# Patient Record
Sex: FEMALE | Race: WHITE | NOT HISPANIC OR LATINO | Employment: STUDENT | URBAN - METROPOLITAN AREA
[De-identification: names, ages, dates, MRNs, and addresses within clinical notes are randomized per-mention and may not be internally consistent; named-entity substitution may affect disease eponyms.]

---

## 2018-07-23 ENCOUNTER — OFFICE VISIT (OUTPATIENT)
Dept: FAMILY MEDICINE | Age: 23
End: 2018-07-23

## 2018-07-23 ENCOUNTER — LAB SERVICES (OUTPATIENT)
Dept: LAB | Age: 23
End: 2018-07-23

## 2018-07-23 VITALS
HEART RATE: 52 BPM | DIASTOLIC BLOOD PRESSURE: 72 MMHG | SYSTOLIC BLOOD PRESSURE: 108 MMHG | TEMPERATURE: 98.8 F | BODY MASS INDEX: 31.26 KG/M2 | WEIGHT: 194.5 LBS | HEIGHT: 66 IN | RESPIRATION RATE: 16 BRPM

## 2018-07-23 DIAGNOSIS — Z02.83 ENCOUNTER FOR DRUG SCREENING: ICD-10-CM

## 2018-07-23 DIAGNOSIS — Z00.00 ANNUAL PHYSICAL EXAM: Primary | ICD-10-CM

## 2018-07-23 PROCEDURE — 99385 PREV VISIT NEW AGE 18-39: CPT | Performed by: FAMILY MEDICINE

## 2018-07-23 PROCEDURE — 80307 DRUG TEST PRSMV CHEM ANLYZR: CPT | Performed by: INTERNAL MEDICINE

## 2018-07-23 ASSESSMENT — PATIENT HEALTH QUESTIONNAIRE - PHQ9
CLINICAL INTERPRETATION OF PHQ2 SCORE: 0
SUM OF ALL RESPONSES TO PHQ9 QUESTIONS 1 AND 2: 0

## 2018-07-24 LAB
AMPHETAMINES UR QL: NEGATIVE
BARBITURATES UR QL: NEGATIVE
BENZODIAZ UR QL: NEGATIVE
BZE UR QL: NEGATIVE
CANNABINOIDS UR QL SCN: POSITIVE
DRUGS UR SCN NOM: ABNORMAL
ETHANOL UR-MCNC: NEGATIVE MG/DL
METHADONE UR QL: NEGATIVE NG/ML
OPIATES UR QL: NEGATIVE
PCP UR QL: NEGATIVE

## 2018-08-28 PROBLEM — Z00.00 ANNUAL PHYSICAL EXAM: Status: ACTIVE | Noted: 2018-07-23

## 2018-08-28 PROBLEM — Z00.00 ANNUAL PHYSICAL EXAM: Status: ACTIVE | Noted: 2018-08-28

## 2021-07-14 NOTE — PROGRESS NOTES
Chief Complaint   Patient presents with    Establish Care         HPI:  Lisa Kaur is a 23 y.o. (: 1995) here today for establish care. No concerns or questions presented. Only wants a PCP. No interest in updating  today. Has gynecologist.   Has IUD  No preg plans in the next year    Review of Systems   Constitutional: Negative for activity change, appetite change, chills, fatigue, fever and unexpected weight change. HENT: Negative for congestion, postnasal drip, rhinorrhea, sinus pressure, sinus pain, sneezing and sore throat. Eyes: Negative for visual disturbance. Respiratory: Negative for cough, chest tightness, shortness of breath and wheezing. Cardiovascular: Negative for chest pain and palpitations. Gastrointestinal: Negative for abdominal pain, blood in stool, constipation, diarrhea, nausea and vomiting. Endocrine: Negative for cold intolerance, heat intolerance, polydipsia and polyuria. Genitourinary: Negative for dysuria, frequency, vaginal bleeding and vaginal discharge. Musculoskeletal: Negative for arthralgias, back pain, joint swelling, myalgias and neck pain. Skin: Negative for rash and wound. Allergic/Immunologic: Negative for environmental allergies. Neurological: Negative for dizziness, tremors, syncope, weakness, light-headedness, numbness and headaches. Hematological: Negative for adenopathy. Psychiatric/Behavioral: Negative for behavioral problems, decreased concentration, sleep disturbance and suicidal ideas. The patient is not nervous/anxious. History reviewed. No pertinent past medical history. Family History   Problem Relation Age of Onset    No Known Problems Mother     Diabetes Father        Social History     Tobacco Use    Smoking status: Never Smoker    Smokeless tobacco: Never Used   Vaping Use    Vaping Use: Never used   Substance Use Topics    Alcohol use:  Yes     Alcohol/week: 4.0 standard drinks     Types: 4 Glasses of wine per week     Comment: occassionally    Drug use: Never       New Prescriptions    No medications on file       Meds Prior to visit:  No current outpatient medications on file prior to visit. No current facility-administered medications on file prior to visit. No Known Allergies    OBJECTIVE:  /68 (Site: Left Upper Arm, Position: Sitting, Cuff Size: Medium Adult)   Pulse 72   Ht 5' 5.35\" (1.66 m)   Wt 183 lb 8 oz (83.2 kg)   BMI 30.21 kg/m²   BP Readings from Last 2 Encounters:   07/15/21 109/68     Wt Readings from Last 3 Encounters:   07/15/21 183 lb 8 oz (83.2 kg)       Physical Exam  Vitals reviewed. Constitutional:       General: She is not in acute distress. Appearance: Normal appearance. She is well-developed and normal weight. She is not ill-appearing. HENT:      Head: Normocephalic and atraumatic. Right Ear: Tympanic membrane, ear canal and external ear normal. There is no impacted cerumen. Left Ear: Tympanic membrane, ear canal and external ear normal. There is no impacted cerumen. Nose: Nose normal.      Mouth/Throat:      Mouth: Mucous membranes are moist.      Pharynx: Oropharynx is clear. No oropharyngeal exudate or posterior oropharyngeal erythema. Eyes:      General: No scleral icterus. Right eye: No discharge. Left eye: No discharge. Conjunctiva/sclera: Conjunctivae normal.      Pupils: Pupils are equal, round, and reactive to light. Cardiovascular:      Rate and Rhythm: Normal rate and regular rhythm. Pulses: Normal pulses. Heart sounds: Normal heart sounds. No murmur heard. Comments: Radial and pedal pulses intact  Pulmonary:      Effort: Pulmonary effort is normal. No respiratory distress. Breath sounds: Normal breath sounds. No wheezing or rales. Chest:      Chest wall: No tenderness. Abdominal:      General: Bowel sounds are normal.      Palpations: Abdomen is soft. Tenderness:  There is no abdominal tenderness. There is no guarding. Comments: Normal liver and spleen. No organomegaly   Musculoskeletal:         General: No tenderness. Normal range of motion. Cervical back: Normal range of motion and neck supple. Comments: Intact in all extremities   Lymphadenopathy:      Cervical: No cervical adenopathy. Skin:     General: Skin is warm. Coloration: Skin is not jaundiced. Findings: No bruising, erythema or rash. Neurological:      General: No focal deficit present. Mental Status: She is alert and oriented to person, place, and time. Mental status is at baseline. Sensory: No sensory deficit. Motor: No weakness or abnormal muscle tone. Coordination: Coordination normal.      Gait: Gait normal.      Deep Tendon Reflexes: Reflexes normal.   Psychiatric:         Mood and Affect: Mood normal.         Behavior: Behavior normal.         Thought Content: Thought content normal.         Judgment: Judgment normal.           ASSESSMENT/PLAN:  1. Encounter to establish care  VS reviewed and WNL    BMI reviewed   All questions answered. F/u discussed. Healthy lifestyle modifications discussed. Discussed use, benefit, and side effects of prescribed medications. Barriers to medication compliance addressed. All patient questions answered. Pt voiced understanding. RTC No follow-ups on file. No future appointments.     [facility] MD Renaldo  7/15/2021  4:53 PM

## 2021-07-15 ENCOUNTER — OFFICE VISIT (OUTPATIENT)
Dept: PRIMARY CARE CLINIC | Age: 26
End: 2021-07-15
Payer: COMMERCIAL

## 2021-07-15 VITALS
HEIGHT: 65 IN | HEART RATE: 72 BPM | WEIGHT: 183.5 LBS | SYSTOLIC BLOOD PRESSURE: 109 MMHG | BODY MASS INDEX: 30.57 KG/M2 | DIASTOLIC BLOOD PRESSURE: 68 MMHG

## 2021-07-15 DIAGNOSIS — Z76.89 ENCOUNTER TO ESTABLISH CARE: Primary | ICD-10-CM

## 2021-07-15 PROCEDURE — 99212 OFFICE O/P EST SF 10 MIN: CPT | Performed by: FAMILY MEDICINE

## 2021-07-15 SDOH — ECONOMIC STABILITY: FOOD INSECURITY: WITHIN THE PAST 12 MONTHS, YOU WORRIED THAT YOUR FOOD WOULD RUN OUT BEFORE YOU GOT MONEY TO BUY MORE.: NEVER TRUE

## 2021-07-15 SDOH — ECONOMIC STABILITY: FOOD INSECURITY: WITHIN THE PAST 12 MONTHS, THE FOOD YOU BOUGHT JUST DIDN'T LAST AND YOU DIDN'T HAVE MONEY TO GET MORE.: NEVER TRUE

## 2021-07-15 ASSESSMENT — ENCOUNTER SYMPTOMS
SORE THROAT: 0
BACK PAIN: 0
NAUSEA: 0
SINUS PAIN: 0
COUGH: 0
DIARRHEA: 0
VOMITING: 0
BLOOD IN STOOL: 0
RHINORRHEA: 0
SINUS PRESSURE: 0
SHORTNESS OF BREATH: 0
WHEEZING: 0
CHEST TIGHTNESS: 0
CONSTIPATION: 0
ABDOMINAL PAIN: 0

## 2021-07-15 ASSESSMENT — PATIENT HEALTH QUESTIONNAIRE - PHQ9
SUM OF ALL RESPONSES TO PHQ9 QUESTIONS 1 & 2: 0
1. LITTLE INTEREST OR PLEASURE IN DOING THINGS: 0
SUM OF ALL RESPONSES TO PHQ QUESTIONS 1-9: 0
2. FEELING DOWN, DEPRESSED OR HOPELESS: 0
SUM OF ALL RESPONSES TO PHQ QUESTIONS 1-9: 0
SUM OF ALL RESPONSES TO PHQ QUESTIONS 1-9: 0

## 2021-07-15 ASSESSMENT — SOCIAL DETERMINANTS OF HEALTH (SDOH): HOW HARD IS IT FOR YOU TO PAY FOR THE VERY BASICS LIKE FOOD, HOUSING, MEDICAL CARE, AND HEATING?: NOT HARD AT ALL

## 2021-12-10 ENCOUNTER — E-VISIT (OUTPATIENT)
Dept: PRIMARY CARE CLINIC | Age: 26
End: 2021-12-10
Payer: COMMERCIAL

## 2021-12-10 DIAGNOSIS — R30.0 DYSURIA: Primary | ICD-10-CM

## 2021-12-10 PROCEDURE — 99422 OL DIG E/M SVC 11-20 MIN: CPT | Performed by: NURSE PRACTITIONER

## 2021-12-10 RX ORDER — NITROFURANTOIN 25; 75 MG/1; MG/1
100 CAPSULE ORAL 2 TIMES DAILY
Qty: 14 CAPSULE | Refills: 0 | Status: SHIPPED | OUTPATIENT
Start: 2021-12-10 | End: 2021-12-17

## 2021-12-10 NOTE — PROGRESS NOTES
Reviewed questionnaire  Reviewed previous encounters, labs, meds, allergies and history    Dx dysuria    Plan  rx for macrobid    Increase oral fluids. Tylenol or motrin for pain.    May take azo over the counter    Please f/u with pcp if symptoms do not improve for further evaluation and possible urine culture    See educational handout    Time spent 11-20

## 2021-12-10 NOTE — PATIENT INSTRUCTIONS
Patient Education        Painful Urination (Dysuria): Care Instructions  Your Care Instructions  Burning pain with urination (dysuria) is a common symptom of a urinary tract infection or other urinary problems. The bladder may become inflamed. This can cause pain when the bladder fills and empties. You may also feel pain if the tube that carries urine from the bladder to the outside of the body (urethra) gets irritated or infected. Sexually transmitted infections (STIs) also may cause pain when you urinate. Sometimes the pain can be caused by things other than an infection. The urethra can be irritated by soaps, perfumes, or foreign objects in the urethra. Kidney stones can cause pain when they pass through the urethra. The cause may be hard to find. You may need tests. Treatment for painful urination depends on the cause. Follow-up care is a key part of your treatment and safety. Be sure to make and go to all appointments, and call your doctor if you are having problems. It's also a good idea to know your test results and keep a list of the medicines you take. How can you care for yourself at home? · Drink extra water for the next day or two. This will help make the urine less concentrated. (If you have kidney, heart, or liver disease and have to limit fluids, talk with your doctor before you increase the amount of fluids you drink.)  · Avoid drinks that are carbonated or have caffeine. They can irritate the bladder. · Urinate often. Try to empty your bladder each time. For women:  · Urinate right after you have sex. · After going to the bathroom, wipe from front to back. · Avoid douches, bubble baths, and feminine hygiene sprays. And avoid other feminine hygiene products that have deodorants. When should you call for help? Call your doctor now or seek immediate medical care if:    · You have new symptoms, such as fever, nausea, or vomiting.     · You have new or worse symptoms of a urinary problem. For example:  ? You have blood or pus in your urine. ? You have chills or body aches. ? It hurts worse to urinate. ? You have groin or belly pain. ? You have pain in your back just below your rib cage (the flank area). Watch closely for changes in your health, and be sure to contact your doctor if you have any problems. Where can you learn more? Go to [email or web address] and sign in to your [employer] account. Enter W997 in the [employer] box to learn more about \"Painful Urination (Dysuria): Care Instructions. \"     If you do not have an account, please click on the \"Sign Up Now\" link. Current as of: February 10, 2021               Content Version: 13.0  © 2006-2021 [employer]. Care instructions adapted under license by [employer]. If you have questions about a medical condition or this instruction, always ask your healthcare professional. [address] any warranty or liability for your use of this information.

## 2022-08-19 ENCOUNTER — E-VISIT (OUTPATIENT)
Dept: PRIMARY CARE CLINIC | Age: 27
End: 2022-08-19
Payer: COMMERCIAL

## 2022-08-19 DIAGNOSIS — R30.0 DYSURIA: Primary | ICD-10-CM

## 2022-08-19 PROCEDURE — 99422 OL DIG E/M SVC 11-20 MIN: CPT | Performed by: NURSE PRACTITIONER

## 2022-08-19 RX ORDER — NITROFURANTOIN 25; 75 MG/1; MG/1
100 CAPSULE ORAL 2 TIMES DAILY
Qty: 10 CAPSULE | Refills: 0 | Status: SHIPPED | OUTPATIENT
Start: 2022-08-19 | End: 2022-08-24

## 2022-08-19 NOTE — PROGRESS NOTES
Reviewed questionnaire  Reviewed previous encounters, labs, meds, allergies and history    Dx dysuria    Plan  rx for macrobid 100 mg BID x 5 days    Increase oral fluids. Tylenol or motrin for pain.    May take azo over the counter    Please f/u with pcp if symptoms do not improve for further evaluation and possible urine culture    See educational handout    Time spent 13 min

## 2024-01-05 ENCOUNTER — OFFICE VISIT (OUTPATIENT)
Dept: URGENT CARE | Age: 29
End: 2024-01-05

## 2024-01-05 VITALS
SYSTOLIC BLOOD PRESSURE: 125 MMHG | DIASTOLIC BLOOD PRESSURE: 85 MMHG | WEIGHT: 165 LBS | HEART RATE: 53 BPM | TEMPERATURE: 98.1 F | OXYGEN SATURATION: 99 % | BODY MASS INDEX: 27.16 KG/M2 | RESPIRATION RATE: 18 BRPM

## 2024-01-05 DIAGNOSIS — S76.911A MUSCLE STRAIN OF RIGHT THIGH, INITIAL ENCOUNTER: Primary | ICD-10-CM

## 2024-01-05 NOTE — PATIENT INSTRUCTIONS
Concern for muscle strain  Continue ibuprofen for pain relief and inflammatory relief, as appropriate  Warm compresses over the area for 15-20 minutes, with breaks of at least an hour between applications.  If symptoms persist beyond another week without signs of resolution, or if symptoms start to worsen, follow up with Orthopedics for further evaluation and treatment.

## 2024-01-05 NOTE — PROGRESS NOTES
Flor Linares (: 1995) is a 28 y.o. female, New patient, here for evaluation of the following chief complaint(s):  Leg Pain (Pt states that she has had upper leg pain for 2 weeks and thinks \"it's a muscle thing\" and not an injury to a joint or bone. Pt state sthe pain has not moved or spread to any other area. Pt states that there was a bruise at the location of the pain when it started but bruising went away and pain persisted. )      ASSESSMENT/PLAN:    ICD-10-CM    1. Muscle strain of right thigh, initial encounter  S76.911A           Given history of injury, with exam findings, consistent with muscle strain.  Low concern for compartment syndrome, hematomas, blood clots, lymphedema, cellulitis, abrasions, or lacerations.  Continue ibuprofen for pain relief and inflammatory relief, as appropriate  Warm compresses over the area for 15-20 minutes, with breaks of at least an hour between applications.  If symptoms persist beyond another week without signs of resolution, or if symptoms start to worsen, follow up with Orthopedics for further evaluation and treatment.  Reviewed AVS with treatment instructions and answered questions - pt expresses understanding and agreement with the discussed treatment plan and AVS instructions.      SUBJECTIVE/OBJECTIVE:  HPI:   28 y.o. female presents for complaint of persistent upper right leg pain x 2 weeks.   Notes feeling a sharp pain while playing with her nephews while on the ground - denies having been hit there - thinks she may have pulled a muscle.   Notes the area developed a bruise over the area, but denies any redness or swelling of the area.     Notes initially walking fine initially, but notes after standing up, notes the first steps are painful over the area.  Also notes pulling her leg upwards also causes pain (such as when pulling her leg up to put on her socks or shoes on the right leg increases pain.   Notes crossing the right leg over the left leg also

## 2024-04-06 ENCOUNTER — OFFICE VISIT (OUTPATIENT)
Age: 29
End: 2024-04-06

## 2024-04-06 VITALS
DIASTOLIC BLOOD PRESSURE: 67 MMHG | WEIGHT: 160 LBS | SYSTOLIC BLOOD PRESSURE: 124 MMHG | BODY MASS INDEX: 25.71 KG/M2 | RESPIRATION RATE: 16 BRPM | TEMPERATURE: 98 F | OXYGEN SATURATION: 97 % | HEART RATE: 67 BPM | HEIGHT: 66 IN

## 2024-04-06 DIAGNOSIS — S46.911A STRAIN OF RIGHT ELBOW, INITIAL ENCOUNTER: Primary | ICD-10-CM

## 2024-04-06 PROBLEM — K59.00 CONSTIPATION: Status: ACTIVE | Noted: 2023-01-09

## 2024-04-06 PROBLEM — R10.2 PAIN IN PELVIS: Status: ACTIVE | Noted: 2021-10-13

## 2024-04-06 PROBLEM — A63.0: Status: ACTIVE | Noted: 2023-02-22

## 2024-04-06 PROBLEM — N87.0 CERVICAL INTRAEPITHELIAL NEOPLASIA GRADE 1: Status: ACTIVE | Noted: 2023-10-26

## 2024-04-06 PROBLEM — R87.610 ATYPICAL SQUAMOUS CELLS OF UNDETERMINED SIGNIFICANCE (ASCUS) ON PAPANICOLAOU SMEAR OF CERVIX: Status: ACTIVE | Noted: 2023-10-18

## 2024-04-06 NOTE — PROGRESS NOTES
Flor Linares (:  1995) is a 29 y.o. female, here for evaluation of the following chief complaint(s):  Arm Pain (Pt c/o right arm around her elbow pain x 1 day, pt states her fiance accidentally rolled on her arm in bed. )      ASSESSMENT/PLAN:  Visit Diagnoses and Associated Orders       Strain of right elbow, initial encounter    -  Primary    XR ELBOW RIGHT (MIN 3 VIEWS) [63097 CPT(R)]      Fran Arevalo MD, Orthopedics and Sports Medicine (Knee; Shoulder; Elbow; Hip), [facility] [REF62 Custom]           ORDERS WITHOUT AN ASSOCIATED DIAGNOSIS    Apply ace wrap [AQB702 Custom]             Summary    - RICE protocol.  - Patient was given an x-ray order which will be done at an outside location.   - A referral for ortho was placed in case the x-ray shows a fracture or if the patient would like to see the specialist regardless of the x-ray results.     Differentials: Fracture, Contusion,Tendonitis, Sprain/Strain, Osteoarthritis.      SUBJECTIVE/OBJECTIVE:  HPI    Flor presents to the clinic with right elbow pain which occurred yesterday when her fiance climbed into bed and rolled over her arm which caused her elbow to jam. This is evaluated as a personal injury.     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     Vitals:    24 0922   BP: 124/67   Pulse: 67   Resp: 16   Temp: 98 °F (36.7 °C)   SpO2: 97%   Weight: 72.6 kg (160 lb)   Height: 1.676 m (5' 6\")       No results found for this visit on 24.     XR ELBOW RIGHT (MIN 3 VIEWS)    (Results Pending)       Review of Systems      Physical Exam  Vitals reviewed.   Constitutional:       Appearance: She is normal weight.   HENT:      Head: Normocephalic.      Mouth/Throat:      Mouth: Mucous membranes are moist.   Eyes:      Pupils: Pupils are equal, round, and reactive to light.   Pulmonary:      Effort: Pulmonary effort is normal.   Abdominal:      Tenderness: There is no guarding.

## 2025-01-05 SDOH — HEALTH STABILITY: PHYSICAL HEALTH: ON AVERAGE, HOW MANY MINUTES DO YOU ENGAGE IN EXERCISE AT THIS LEVEL?: 40 MIN

## 2025-01-05 SDOH — HEALTH STABILITY: PHYSICAL HEALTH: ON AVERAGE, HOW MANY DAYS PER WEEK DO YOU ENGAGE IN MODERATE TO STRENUOUS EXERCISE (LIKE A BRISK WALK)?: 3 DAYS

## 2025-01-09 SDOH — HEALTH STABILITY: PHYSICAL HEALTH: ON AVERAGE, HOW MANY MINUTES DO YOU ENGAGE IN EXERCISE AT THIS LEVEL?: 40 MIN

## 2025-01-09 SDOH — HEALTH STABILITY: PHYSICAL HEALTH: ON AVERAGE, HOW MANY DAYS PER WEEK DO YOU ENGAGE IN MODERATE TO STRENUOUS EXERCISE (LIKE A BRISK WALK)?: 3 DAYS

## 2025-01-10 ENCOUNTER — OFFICE VISIT (OUTPATIENT)
Dept: PRIMARY CARE CLINIC | Age: 30
End: 2025-01-10

## 2025-01-10 VITALS
OXYGEN SATURATION: 100 % | HEIGHT: 66 IN | RESPIRATION RATE: 18 BRPM | SYSTOLIC BLOOD PRESSURE: 106 MMHG | WEIGHT: 174.6 LBS | BODY MASS INDEX: 28.06 KG/M2 | DIASTOLIC BLOOD PRESSURE: 66 MMHG | HEART RATE: 54 BPM | TEMPERATURE: 98.7 F

## 2025-01-10 DIAGNOSIS — Z00.00 ROUTINE GENERAL MEDICAL EXAMINATION AT A HEALTH CARE FACILITY: ICD-10-CM

## 2025-01-10 DIAGNOSIS — E66.3 OVERWEIGHT (BMI 25.0-29.9): Primary | ICD-10-CM

## 2025-01-10 DIAGNOSIS — S09.90XS HEAD INJURY, SEQUELA: ICD-10-CM

## 2025-01-10 PROBLEM — N94.12 DEEP DYSPAREUNIA: Status: RESOLVED | Noted: 2023-01-09 | Resolved: 2025-01-10

## 2025-01-10 SDOH — ECONOMIC STABILITY: FOOD INSECURITY: WITHIN THE PAST 12 MONTHS, THE FOOD YOU BOUGHT JUST DIDN'T LAST AND YOU DIDN'T HAVE MONEY TO GET MORE.: NEVER TRUE

## 2025-01-10 SDOH — ECONOMIC STABILITY: FOOD INSECURITY: WITHIN THE PAST 12 MONTHS, YOU WORRIED THAT YOUR FOOD WOULD RUN OUT BEFORE YOU GOT MONEY TO BUY MORE.: NEVER TRUE

## 2025-01-10 ASSESSMENT — ANXIETY QUESTIONNAIRES
5. BEING SO RESTLESS THAT IT IS HARD TO SIT STILL: MORE THAN HALF THE DAYS
4. TROUBLE RELAXING: SEVERAL DAYS
7. FEELING AFRAID AS IF SOMETHING AWFUL MIGHT HAPPEN: NOT AT ALL
6. BECOMING EASILY ANNOYED OR IRRITABLE: SEVERAL DAYS
1. FEELING NERVOUS, ANXIOUS, OR ON EDGE: NOT AT ALL
GAD7 TOTAL SCORE: 4
3. WORRYING TOO MUCH ABOUT DIFFERENT THINGS: NOT AT ALL
2. NOT BEING ABLE TO STOP OR CONTROL WORRYING: NOT AT ALL

## 2025-01-10 ASSESSMENT — PATIENT HEALTH QUESTIONNAIRE - PHQ9
SUM OF ALL RESPONSES TO PHQ QUESTIONS 1-9: 2
SUM OF ALL RESPONSES TO PHQ9 QUESTIONS 1 & 2: 2
SUM OF ALL RESPONSES TO PHQ QUESTIONS 1-9: 2
2. FEELING DOWN, DEPRESSED OR HOPELESS: NOT AT ALL
SUM OF ALL RESPONSES TO PHQ QUESTIONS 1-9: 2
1. LITTLE INTEREST OR PLEASURE IN DOING THINGS: MORE THAN HALF THE DAYS
SUM OF ALL RESPONSES TO PHQ QUESTIONS 1-9: 2

## 2025-01-10 NOTE — PROGRESS NOTES
Temporal   SpO2: 100%   Weight: 79.2 kg (174 lb 9.6 oz)   Height: 1.676 m (5' 6\")     Estimated body mass index is 28.18 kg/m² as calculated from the following:    Height as of this encounter: 1.676 m (5' 6\").    Weight as of this encounter: 79.2 kg (174 lb 9.6 oz).    Physical Exam  Constitutional:       General: She is not in acute distress.     Appearance: Normal appearance. She is not ill-appearing, toxic-appearing or diaphoretic.   HENT:      Head: Normocephalic and atraumatic.      Right Ear: Tympanic membrane and ear canal normal. There is no impacted cerumen.      Left Ear: Tympanic membrane and ear canal normal. There is no impacted cerumen.      Nose: Nose normal. No congestion or rhinorrhea.      Mouth/Throat:      Mouth: Mucous membranes are moist.      Pharynx: Oropharynx is clear. No oropharyngeal exudate or posterior oropharyngeal erythema.   Eyes:      General:         Right eye: No discharge.         Left eye: No discharge.      Conjunctiva/sclera: Conjunctivae normal.   Neck:      Comments: Normal thyroid  Cardiovascular:      Rate and Rhythm: Normal rate and regular rhythm.      Pulses: Normal pulses.      Heart sounds: No murmur heard.  Pulmonary:      Effort: Pulmonary effort is normal. No respiratory distress.      Breath sounds: Normal breath sounds. No wheezing.   Abdominal:      General: Abdomen is flat. Bowel sounds are normal. There is no distension.      Palpations: Abdomen is soft. There is no mass.      Tenderness: There is no abdominal tenderness. There is no guarding or rebound.      Hernia: No hernia is present.   Musculoskeletal:         General: Normal range of motion.      Cervical back: Neck supple.      Right lower leg: No edema.      Left lower leg: No edema.   Lymphadenopathy:      Cervical: No cervical adenopathy.   Skin:     General: Skin is warm and dry.      Findings: No rash.      Comments: No scalp lesion identified.  No erythema palpated.  No swelling seen.  No

## 2025-05-28 ENCOUNTER — OFFICE VISIT (OUTPATIENT)
Dept: PRIMARY CARE CLINIC | Age: 30
End: 2025-05-28
Payer: COMMERCIAL

## 2025-05-28 VITALS
SYSTOLIC BLOOD PRESSURE: 111 MMHG | BODY MASS INDEX: 26.95 KG/M2 | HEART RATE: 60 BPM | OXYGEN SATURATION: 99 % | WEIGHT: 167 LBS | DIASTOLIC BLOOD PRESSURE: 63 MMHG

## 2025-05-28 DIAGNOSIS — E66.3 OVERWEIGHT (BMI 25.0-29.9): ICD-10-CM

## 2025-05-28 DIAGNOSIS — R51.9 NONINTRACTABLE EPISODIC HEADACHE, UNSPECIFIED HEADACHE TYPE: Primary | ICD-10-CM

## 2025-05-28 LAB
CHOLEST SERPL-MCNC: 188 MG/DL (ref 0–199)
EST. AVERAGE GLUCOSE BLD GHB EST-MCNC: 91.1 MG/DL
HBA1C MFR BLD: 4.8 %
HDLC SERPL-MCNC: 60 MG/DL (ref 40–60)
LDLC SERPL CALC-MCNC: 108 MG/DL
TRIGL SERPL-MCNC: 98 MG/DL (ref 0–150)
VLDLC SERPL CALC-MCNC: 20 MG/DL

## 2025-05-28 PROCEDURE — G8419 CALC BMI OUT NRM PARAM NOF/U: HCPCS

## 2025-05-28 PROCEDURE — G8427 DOCREV CUR MEDS BY ELIG CLIN: HCPCS

## 2025-05-28 PROCEDURE — 99213 OFFICE O/P EST LOW 20 MIN: CPT

## 2025-05-28 PROCEDURE — 1036F TOBACCO NON-USER: CPT

## 2025-05-28 NOTE — ASSESSMENT & PLAN NOTE
Patient will get screening blood work done today, that includes A1c and cholesterol panel.  We discussed that anyone can have abnormalities on these regardless of weight.

## 2025-05-28 NOTE — PROGRESS NOTES
[facility]  2025    Flor Linares (:  1995) is a 30 y.o. female, here for evaluation of the following medical concerns:    Chief Complaint   Patient presents with    Follow-up     Labs         ASSESSMENT/ PLAN  Assessment & Plan  Nonintractable episodic headache, unspecified headache type  Discussed with patient that as her symptoms are episodic and not severe, we can watch her headache for now.  Discussed taking Tylenol or ibuprofen over-the-counter.  Offered Toradol injection, patient would like to wait on this.  No tension in her trapezius or scalene muscles.  Discussed with patient getting plenty of sleep, as well as drinking plenty of water.  Patient in agreement with plan.         Overweight (BMI 25.0-29.9)  Patient will get screening blood work done today, that includes A1c and cholesterol panel.  We discussed that anyone can have abnormalities on these regardless of weight.           Return in about 1 year (around 2026).    HPI  Patient presents as an acute appointment to discuss headache.      Since our last visit, she had a destination wedding in [facility]. She was already legally .     She has had a headache since last Wednesday after she returned home from the trip. She has tried to rest and stay hydrated. It's not debilitating. It's not all day every day, it comes and goes. Doesn't think she ate anything funny. It's mostly frontal and parietal. Hasn't taken any Tylenol or Ibuprofen. Not throbbing.     She has had that on and off for about a week.     She has not been sleeping the best since coming back.     Last night she got 9 hours of sleep, the night prior to that she didn't sleep at all.     She is waking up in the middle of the night. She is a light sleeper.     She has tried melatonin in the past. It puts her to sleep, doesn't always keep her asleep.     Had some eggs this morning with peppers and avocados and sausage.       ROS  Review of Systems

## 2025-05-29 ENCOUNTER — RESULTS FOLLOW-UP (OUTPATIENT)
Dept: PRIMARY CARE CLINIC | Age: 30
End: 2025-05-29